# Patient Record
Sex: FEMALE | Race: WHITE | NOT HISPANIC OR LATINO | Employment: OTHER | URBAN - METROPOLITAN AREA
[De-identification: names, ages, dates, MRNs, and addresses within clinical notes are randomized per-mention and may not be internally consistent; named-entity substitution may affect disease eponyms.]

---

## 2019-02-06 ENCOUNTER — HOSPITAL ENCOUNTER (EMERGENCY)
Facility: HOSPITAL | Age: 54
Discharge: HOME | End: 2019-02-06
Attending: EMERGENCY MEDICINE
Payer: MEDICARE

## 2019-02-06 VITALS
TEMPERATURE: 98 F | SYSTOLIC BLOOD PRESSURE: 130 MMHG | DIASTOLIC BLOOD PRESSURE: 85 MMHG | RESPIRATION RATE: 18 BRPM | HEIGHT: 67 IN | OXYGEN SATURATION: 97 % | WEIGHT: 207 LBS | BODY MASS INDEX: 32.49 KG/M2 | HEART RATE: 86 BPM

## 2019-02-06 DIAGNOSIS — R10.9 FLANK PAIN: Primary | ICD-10-CM

## 2019-02-06 LAB
ALBUMIN SERPL BCP-MCNC: 3.7 G/DL
ALP SERPL-CCNC: 87 U/L
ALT SERPL W/O P-5'-P-CCNC: 40 U/L
ANION GAP SERPL CALC-SCNC: 12 MMOL/L
AST SERPL-CCNC: 35 U/L
BACTERIA #/AREA URNS HPF: ABNORMAL /HPF
BASOPHILS # BLD AUTO: 0.04 K/UL
BASOPHILS NFR BLD: 0.6 %
BILIRUB SERPL-MCNC: 0.7 MG/DL
BILIRUB UR QL STRIP: ABNORMAL
BUN SERPL-MCNC: 13 MG/DL
CALCIUM SERPL-MCNC: 9.5 MG/DL
CAOX CRY URNS QL MICRO: ABNORMAL
CHLORIDE SERPL-SCNC: 107 MMOL/L
CLARITY UR: CLEAR
CO2 SERPL-SCNC: 20 MMOL/L
COLOR UR: YELLOW
CREAT SERPL-MCNC: 0.6 MG/DL
DIFFERENTIAL METHOD: ABNORMAL
EOSINOPHIL # BLD AUTO: 0.1 K/UL
EOSINOPHIL NFR BLD: 1.6 %
ERYTHROCYTE [DISTWIDTH] IN BLOOD BY AUTOMATED COUNT: 12.3 %
EST. GFR  (AFRICAN AMERICAN): >60 ML/MIN/1.73 M^2
EST. GFR  (NON AFRICAN AMERICAN): >60 ML/MIN/1.73 M^2
GLUCOSE SERPL-MCNC: 190 MG/DL
GLUCOSE UR QL STRIP: ABNORMAL
HCT VFR BLD AUTO: 47.3 %
HGB BLD-MCNC: 16.8 G/DL
HGB UR QL STRIP: NEGATIVE
KETONES UR QL STRIP: ABNORMAL
LEUKOCYTE ESTERASE UR QL STRIP: NEGATIVE
LIPASE SERPL-CCNC: 37 U/L
LYMPHOCYTES # BLD AUTO: 2.5 K/UL
LYMPHOCYTES NFR BLD: 36.1 %
MCH RBC QN AUTO: 33.7 PG
MCHC RBC AUTO-ENTMCNC: 35.5 G/DL
MCV RBC AUTO: 95 FL
MICROSCOPIC COMMENT: ABNORMAL
MONOCYTES # BLD AUTO: 0.4 K/UL
MONOCYTES NFR BLD: 5.7 %
NEUTROPHILS # BLD AUTO: 3.9 K/UL
NEUTROPHILS NFR BLD: 56 %
NITRITE UR QL STRIP: NEGATIVE
PH UR STRIP: 6 [PH] (ref 5–8)
PLATELET # BLD AUTO: 197 K/UL
PMV BLD AUTO: 10.9 FL
POTASSIUM SERPL-SCNC: 4 MMOL/L
PROT SERPL-MCNC: 6.8 G/DL
PROT UR QL STRIP: NEGATIVE
RBC # BLD AUTO: 4.98 M/UL
SODIUM SERPL-SCNC: 139 MMOL/L
SP GR UR STRIP: >=1.03 (ref 1–1.03)
URN SPEC COLLECT METH UR: ABNORMAL
UROBILINOGEN UR STRIP-ACNC: NEGATIVE EU/DL
WBC # BLD AUTO: 6.9 K/UL
WBC #/AREA URNS HPF: 2 /HPF (ref 0–5)
YEAST URNS QL MICRO: ABNORMAL

## 2019-02-06 PROCEDURE — 99285 EMERGENCY DEPT VISIT HI MDM: CPT | Mod: 25

## 2019-02-06 PROCEDURE — 83690 ASSAY OF LIPASE: CPT

## 2019-02-06 PROCEDURE — 80053 COMPREHEN METABOLIC PANEL: CPT

## 2019-02-06 PROCEDURE — 81000 URINALYSIS NONAUTO W/SCOPE: CPT

## 2019-02-06 PROCEDURE — 85025 COMPLETE CBC W/AUTO DIFF WBC: CPT

## 2019-02-06 PROCEDURE — 86803 HEPATITIS C AB TEST: CPT

## 2019-02-06 PROCEDURE — 96374 THER/PROPH/DIAG INJ IV PUSH: CPT

## 2019-02-06 PROCEDURE — 86703 HIV-1/HIV-2 1 RESULT ANTBDY: CPT

## 2019-02-06 PROCEDURE — 63600175 PHARM REV CODE 636 W HCPCS: Performed by: EMERGENCY MEDICINE

## 2019-02-06 RX ORDER — TRAMADOL HYDROCHLORIDE 50 MG/1
50 TABLET ORAL EVERY 6 HOURS PRN
Qty: 10 TABLET | Refills: 0 | Status: SHIPPED | OUTPATIENT
Start: 2019-02-06 | End: 2019-02-16

## 2019-02-06 RX ORDER — METOPROLOL TARTRATE 25 MG/1
25 TABLET, FILM COATED ORAL 2 TIMES DAILY
COMMUNITY

## 2019-02-06 RX ORDER — IPRATROPIUM BROMIDE AND ALBUTEROL SULFATE 2.5; .5 MG/3ML; MG/3ML
3 SOLUTION RESPIRATORY (INHALATION) EVERY 6 HOURS PRN
COMMUNITY

## 2019-02-06 RX ORDER — ALBUTEROL SULFATE 90 UG/1
2 AEROSOL, METERED RESPIRATORY (INHALATION) EVERY 6 HOURS PRN
COMMUNITY

## 2019-02-06 RX ORDER — MORPHINE SULFATE 10 MG/ML
4 INJECTION INTRAMUSCULAR; INTRAVENOUS; SUBCUTANEOUS
Status: COMPLETED | OUTPATIENT
Start: 2019-02-06 | End: 2019-02-06

## 2019-02-06 RX ORDER — METFORMIN HYDROCHLORIDE 1000 MG/1
1000 TABLET ORAL 2 TIMES DAILY WITH MEALS
COMMUNITY

## 2019-02-06 RX ORDER — AMOXICILLIN 500 MG
CAPSULE ORAL DAILY
COMMUNITY

## 2019-02-06 RX ORDER — TRIAMTERENE AND HYDROCHLOROTHIAZIDE 37.5; 25 MG/1; MG/1
1 CAPSULE ORAL EVERY MORNING
COMMUNITY

## 2019-02-06 RX ADMIN — MORPHINE SULFATE 4 MG: 10 INJECTION, SOLUTION INTRAMUSCULAR; INTRAVENOUS at 11:02

## 2019-02-06 NOTE — ED PROVIDER NOTES
SCRIBE #1 NOTE: I, Corinne Mack, am scribing for, and in the presence of, Josef Monge MD. I have scribed the entire note.      History      Chief Complaint   Patient presents with    Abdominal Pain     pt reports R side pain x 1 month worsening this week       Review of patient's allergies indicates:   Allergen Reactions    Asa [aspirin]         HPI   HPI    2019, 10:13 AM   History obtained from the patient      History of Present Illness: Sasha Arriaga is a 54 y.o. female patient who presents to the Emergency Department for R sided abd pain which onset gradually a month ago and began to worsen significantly a week ago. Symptoms are constant and moderate in severity. No mitigating or exacerbating factors reported. Associated sxs include N/V. Pt states she has been taking OTC laxatives so she has not been having any issues with BMs. Patient denies any fever, chills, CP, SOB, diarrhea, back pain, flank pain, dysuria, hematuria, HA, dizziness, and all other sxs at this time. No other prior Tx reported. No further complaints or concerns at this time.         Arrival mode: Personal vehicle    PCP: DAILY Wynn       Past Medical History:  Past Medical History:   Diagnosis Date    Asthma     COPD (chronic obstructive pulmonary disease)     Diabetes mellitus     Hiatal hernia     HTN (hypertension)        Past Surgical History:  Past Surgical History:   Procedure Laterality Date     SECTION      HERNIA REPAIR      TUBAL LIGATION           Family History:  History reviewed. No pertinent family history.    Social History:  Social History     Tobacco Use    Smoking status: Current Every Day Smoker     Packs/day: 2.00     Types: Cigarettes    Smokeless tobacco: Never Used   Substance and Sexual Activity    Alcohol use: No     Frequency: Never    Drug use: No    Sexual activity: Not on file       ROS   Review of Systems   Constitutional: Negative for chills and fever.  "  Respiratory: Negative for cough and shortness of breath.    Cardiovascular: Negative for chest pain and leg swelling.   Gastrointestinal: Positive for abdominal pain, nausea and vomiting. Negative for diarrhea.   Genitourinary: Negative for dysuria, flank pain and hematuria.   Musculoskeletal: Negative for back pain, neck pain and neck stiffness.   Skin: Negative for rash and wound.   Neurological: Negative for dizziness, light-headedness, numbness and headaches.   All other systems reviewed and are negative.    Physical Exam      Initial Vitals [02/06/19 1008]   BP Pulse Resp Temp SpO2   (!) 172/104 94 18 98.3 °F (36.8 °C) 96 %      MAP       --          Physical Exam  Nursing Notes and Vital Signs Reviewed.  Constitutional: Patient is in no acute distress. Well-developed and well-nourished.  Head: Atraumatic. Normocephalic.  Eyes: PERRL. EOM intact. Conjunctivae are not pale. No scleral icterus.  ENT: Mucous membranes are moist. Oropharynx is clear and symmetric.    Neck: Supple. Full ROM. No lymphadenopathy.  Cardiovascular: Regular rate. Regular rhythm. No murmurs, rubs, or gallops. Distal pulses are 2+ and symmetric.  Pulmonary/Chest: No respiratory distress. Clear to auscultation bilaterally. No wheezing or rales.  Abdominal: Soft and non-distended.  There is no tenderness.  No rebound, guarding, or rigidity.   Genitourinary: R CVA tenderness.  Musculoskeletal: Moves all extremities. No obvious deformities. No edema.   Skin: Warm and dry.  Neurological:  Alert, awake, and appropriate.  Normal speech.  No acute focal neurological deficits are appreciated.  Psychiatric: Normal affect. Good eye contact. Appropriate in content.    ED Course    Procedures  ED Vital Signs:  Vitals:    02/06/19 1008 02/06/19 1127   BP: (!) 172/104 130/85   Pulse: 94 86   Resp: 18    Temp: 98.3 °F (36.8 °C)    TempSrc: Oral    SpO2: 96% 97%   Weight: 93.9 kg (207 lb)    Height: 5' 7" (1.702 m)        Abnormal Lab Results:  Labs " Reviewed   CBC W/ AUTO DIFFERENTIAL - Abnormal; Notable for the following components:       Result Value    Hemoglobin 16.8 (*)     MCH 33.7 (*)     All other components within normal limits   COMPREHENSIVE METABOLIC PANEL - Abnormal; Notable for the following components:    CO2 20 (*)     Glucose 190 (*)     All other components within normal limits   URINALYSIS, REFLEX TO URINE CULTURE - Abnormal; Notable for the following components:    Specific Gravity, UA >=1.030 (*)     Glucose, UA 3+ (*)     Ketones, UA 2+ (*)     Bilirubin (UA) 1+ (*)     All other components within normal limits    Narrative:     Preferred Collection Type->Urine, Clean Catch   URINALYSIS MICROSCOPIC - Abnormal; Notable for the following components:    Ca Oxalate Kala, UA Many (*)     All other components within normal limits    Narrative:     Preferred Collection Type->Urine, Clean Catch   LIPASE   HIV 1 / 2 ANTIBODY   HEPATITIS C ANTIBODY        All Lab Results:  Results for orders placed or performed during the hospital encounter of 02/06/19   CBC auto differential   Result Value Ref Range    WBC 6.90 3.90 - 12.70 K/uL    RBC 4.98 4.00 - 5.40 M/uL    Hemoglobin 16.8 (H) 12.0 - 16.0 g/dL    Hematocrit 47.3 37.0 - 48.5 %    MCV 95 82 - 98 fL    MCH 33.7 (H) 27.0 - 31.0 pg    MCHC 35.5 32.0 - 36.0 g/dL    RDW 12.3 11.5 - 14.5 %    Platelets 197 150 - 350 K/uL    MPV 10.9 9.2 - 12.9 fL    Gran # (ANC) 3.9 1.8 - 7.7 K/uL    Lymph # 2.5 1.0 - 4.8 K/uL    Mono # 0.4 0.3 - 1.0 K/uL    Eos # 0.1 0.0 - 0.5 K/uL    Baso # 0.04 0.00 - 0.20 K/uL    Gran% 56.0 38.0 - 73.0 %    Lymph% 36.1 18.0 - 48.0 %    Mono% 5.7 4.0 - 15.0 %    Eosinophil% 1.6 0.0 - 8.0 %    Basophil% 0.6 0.0 - 1.9 %    Differential Method Automated    Comprehensive metabolic panel   Result Value Ref Range    Sodium 139 136 - 145 mmol/L    Potassium 4.0 3.5 - 5.1 mmol/L    Chloride 107 95 - 110 mmol/L    CO2 20 (L) 23 - 29 mmol/L    Glucose 190 (H) 70 - 110 mg/dL    BUN, Bld 13 6 -  20 mg/dL    Creatinine 0.6 0.5 - 1.4 mg/dL    Calcium 9.5 8.7 - 10.5 mg/dL    Total Protein 6.8 6.0 - 8.4 g/dL    Albumin 3.7 3.5 - 5.2 g/dL    Total Bilirubin 0.7 0.1 - 1.0 mg/dL    Alkaline Phosphatase 87 55 - 135 U/L    AST 35 10 - 40 U/L    ALT 40 10 - 44 U/L    Anion Gap 12 8 - 16 mmol/L    eGFR if African American >60 >60 mL/min/1.73 m^2    eGFR if non African American >60 >60 mL/min/1.73 m^2   Urinalysis, Reflex to Urine Culture Urine, Clean Catch   Result Value Ref Range    Specimen UA Urine, Clean Catch     Color, UA Yellow Yellow, Straw, Марина    Appearance, UA Clear Clear    pH, UA 6.0 5.0 - 8.0    Specific Gravity, UA >=1.030 (A) 1.005 - 1.030    Protein, UA Negative Negative    Glucose, UA 3+ (A) Negative    Ketones, UA 2+ (A) Negative    Bilirubin (UA) 1+ (A) Negative    Occult Blood UA Negative Negative    Nitrite, UA Negative Negative    Urobilinogen, UA Negative <2.0 EU/dL    Leukocytes, UA Negative Negative   Lipase   Result Value Ref Range    Lipase 37 4 - 60 U/L   Urinalysis Microscopic   Result Value Ref Range    WBC, UA 2 0 - 5 /hpf    Bacteria, UA Rare None-Occ /hpf    Yeast, UA None None    Ca Oxalate Kala, UA Many (A) None-Moderate    Microscopic Comment SEE COMMENT        Imaging Results:  Imaging Results          CT Renal Stone Study ABD Pelvis WO (Final result)  Result time 02/06/19 11:33:02    Final result by CAMPBELL Lechuga Sr., MD (02/06/19 11:33:02)                 Impression:      1. There are nonobstructive stones in both kidneys. One of the larger ones measures 2 mm and is located in the inferior pole of the left kidney.  2. There is a tiny pericardial effusion.  3. There is a mild amount of atherosclerosis.  All CT scans at this facility use dose modulation, iterative reconstruction, and/or weight base dosing when appropriate to reduce radiation dose when appropriate to reduce radiation dose to as low as reasonably achievable.      Electronically signed by: César Lechuga  MD  Date:    02/06/2019  Time:    11:33             Narrative:    EXAMINATION:  CT RENAL STONE STUDY ABD PELVIS WO    CLINICAL HISTORY:  Flank pain, stone disease suspected;    TECHNIQUE:  Standard abdomen and pelvis CT protocol without oral or IV contrast was performed.    FINDINGS:  Finding: The size of the heart is within normal limits.  There is a tiny pericardial effusion.  The lungs are clear. There is no pneumothorax or pleural effusion.    There are calcifications scattered throughout the liver and spleen.  The gallbladder, pancreas, and adrenals are normal in appearance.  There are nonobstructive stones in both kidneys.  One of the larger ones measures 2 mm and is located in the inferior pole of the left kidney.  The ureters and the urinary bladder are normal in appearance. The appendix and the rest of the gastrointestinal system are normal in appearance.  The uterus and ovaries are not optimally visualized.  There is no free fluid within the abdomen or pelvis. There is no pneumoperitoneum.  There is a mild amount of atherosclerosis.                                        The Emergency Provider reviewed the vital signs and test results, which are outlined above.    ED Discussion     11:48 AM: Reassessed pt at this time. Pt is awake, alert, and in no distress. Discussed with pt all pertinent ED information and results. Discussed pt dx and plan of tx. Gave pt all f/u and return to the ED instructions. All questions and concerns were addressed at this time. Pt expresses understanding of information and instructions, and is comfortable with plan to discharge. Pt is stable for discharge.    I discussed with patient and/or family/caretaker that evaluation in the ED does not suggest any emergent or life threatening medical conditions requiring immediate intervention beyond what was provided in the ED, and I believe patient is safe for discharge.  Regardless, an unremarkable evaluation in the ED does not preclude  the development or presence of a serious of life threatening condition. As such, patient was instructed to return immediately for any worsening or change in current symptoms.      ED Medication(s):  Medications   morphine injection 4 mg (4 mg Intravenous Given 2/6/19 1126)          Medication List      START taking these medications    traMADol 50 mg tablet  Commonly known as:  ULTRAM  Take 1 tablet (50 mg total) by mouth every 6 (six) hours as needed for Pain.        ASK your doctor about these medications    albuterol 90 mcg/actuation inhaler  Commonly known as:  PROVENTIL/VENTOLIN HFA     albuterol-ipratropium 2.5 mg-0.5 mg/3 mL nebulizer solution  Commonly known as:  DUO-NEB     fish oil-omega-3 fatty acids 300-1,000 mg capsule     insulin detemir U-100 100 unit/mL injection  Commonly known as:  LEVEMIR     liraglutide 0.6 mg/0.1 mL (18 mg/3 mL) subq PNIJ 0.6 mg/0.1 mL (18 mg/3 mL) Pnij  Commonly known as:  VICTOZA 2-THOMAS     metFORMIN 1000 MG tablet  Commonly known as:  GLUCOPHAGE     metoprolol tartrate 25 MG tablet  Commonly known as:  LOPRESSOR     triamterene-hydrochlorothiazide 37.5-25 mg 37.5-25 mg per capsule  Commonly known as:  DYAZIDE           Where to Get Your Medications      You can get these medications from any pharmacy    Bring a paper prescription for each of these medications  · traMADol 50 mg tablet         Follow-up Information     DAILY Wynn In 2 days.    Specialty:  Family Medicine  Contact information:  [address] [facility] [address] [phone]                     Medical Decision Making    Medical Decision Making:   Clinical Tests:   Lab Tests: Ordered and Reviewed  Radiological Study: Ordered and Reviewed           Scribe Attestation:   Scribe #1: I performed the above scribed service and the documentation accurately describes the services I performed. I attest to the accuracy of the note 02/06/2019 11:48 AM.    Attending:   Physician Attestation Statement for  Scribe #1: I, Josef Monge MD, personally performed the services described in this documentation, as scribed by Corinne Mack, in my presence, and it is both accurate and complete.          Clinical Impression       ICD-10-CM ICD-9-CM   1. Flank pain R10.9 789.09       Disposition:   Disposition: Discharged  Condition: Stable           Josef Monge MD  02/06/19 1312

## 2019-02-07 LAB
HCV AB SERPL QL IA: NEGATIVE
HIV 1+2 AB+HIV1 P24 AG SERPL QL IA: NEGATIVE

## 2022-03-17 NOTE — ED NOTES
Patient c/o right sided abdominal pain x 1 month that worsened over the last week. Patient denies nausea and vomiting with pain at this time.    Patient identifiers verified and correct for Sasha Arriaga.    LOC: The patient is awake, alert and aware of environment with an appropriate affect, the patient is oriented x 3 and speaking appropriately.  APPEARANCE: Patient resting comfortably and in no acute distress, patient is clean and well groomed, patient's clothing is properly fastened.  SKIN: The skin is warm and dry, color consistent with ethnicity, patient has normal skin turgor and moist mucus membranes, skin intact, no breakdown or bruising noted.  MUSCULOSKELETAL: Patient moving all extremities spontaneously.  RESPIRATORY: Airway is open and patent, respirations are spontaneous.  CARDIAC: Patient has a normal rate, no periphreal edema noted, capillary refill < 3 seconds.  ABDOMEN: Soft and non tender to palpation.     [FreeTextEntry1] : AEEG June 2021: infrequent generalized spikes, no seizure\par Brain MRI 2/2/21: normal